# Patient Record
Sex: MALE | Race: BLACK OR AFRICAN AMERICAN | NOT HISPANIC OR LATINO | Employment: FULL TIME | URBAN - METROPOLITAN AREA
[De-identification: names, ages, dates, MRNs, and addresses within clinical notes are randomized per-mention and may not be internally consistent; named-entity substitution may affect disease eponyms.]

---

## 2023-05-20 ENCOUNTER — ANCILLARY PROCEDURE (OUTPATIENT)
Dept: GENERAL RADIOLOGY | Facility: CLINIC | Age: 23
End: 2023-05-20
Attending: PHYSICIAN ASSISTANT
Payer: COMMERCIAL

## 2023-05-20 ENCOUNTER — OFFICE VISIT (OUTPATIENT)
Dept: URGENT CARE | Facility: URGENT CARE | Age: 23
End: 2023-05-20
Payer: COMMERCIAL

## 2023-05-20 VITALS
DIASTOLIC BLOOD PRESSURE: 76 MMHG | OXYGEN SATURATION: 98 % | HEART RATE: 66 BPM | WEIGHT: 150.6 LBS | SYSTOLIC BLOOD PRESSURE: 123 MMHG | TEMPERATURE: 98 F

## 2023-05-20 DIAGNOSIS — S90.31XA CONTUSION OF RIGHT FOOT, INITIAL ENCOUNTER: Primary | ICD-10-CM

## 2023-05-20 DIAGNOSIS — M79.671 RIGHT FOOT PAIN: ICD-10-CM

## 2023-05-20 PROCEDURE — 73630 X-RAY EXAM OF FOOT: CPT | Mod: TC | Performed by: RADIOLOGY

## 2023-05-20 PROCEDURE — 99204 OFFICE O/P NEW MOD 45 MIN: CPT | Performed by: PHYSICIAN ASSISTANT

## 2023-05-20 ASSESSMENT — ENCOUNTER SYMPTOMS
FREQUENCY: 0
RESPIRATORY NEGATIVE: 1
NAUSEA: 0
DYSURIA: 0
DIARRHEA: 0
VOMITING: 0
CONSTITUTIONAL NEGATIVE: 1
HEADACHES: 0
SHORTNESS OF BREATH: 0
WHEEZING: 0
ALLERGIC/IMMUNOLOGIC NEGATIVE: 1
MYALGIAS: 0
NEUROLOGICAL NEGATIVE: 1
FEVER: 0
HEMATURIA: 0
SORE THROAT: 0
COUGH: 0
ARTHRALGIAS: 1
ABDOMINAL PAIN: 0
PALPITATIONS: 0
CHILLS: 0
CARDIOVASCULAR NEGATIVE: 1
CHEST TIGHTNESS: 0
GASTROINTESTINAL NEGATIVE: 1

## 2023-05-20 NOTE — LETTER
May 20, 2023      Ghazala Perrin  [address] [facility] [id number]        To Whom It May Concern:    Ghazala Perrin  was seen on 5/20/2023.  He may return to work with no restrictions his next available shift.      Sincerely,        Zaid Fish PA-C

## 2023-05-20 NOTE — PROGRESS NOTES
Chief Complaint:     Chief Complaint   Patient presents with     Foot Pain     Right; pallet fell on foot      ASSESSMENT     1. Contusion of right foot, initial encounter    2. Right foot pain       PLAN    XR of the R foot was negative for any acute fracture per my read.  RICE discussed  Recommended rest and avoidance of activities which cause pain or swelling.  Pain relief: Acetaminophen and or Ibuprofen with food.  Patient verbalized understanding, and agrees with this plan.       HPI: Ghazala Perrin is an 23 year old male who presents today for evaluation of R foot injury.  Onset of the injury was this morning after a pallet fell on his foot at work.  He complains of foot pain and some swelling. Nothing makes the pain better or worse.  He has not tried anything for the pain.    Patient denies any numbness, tingling, or dysfunction of the R foot.    Patient is new to [facility].      ROS:      Review of Systems   Constitutional: Negative.  Negative for chills and fever.   HENT: Negative.  Negative for sore throat.    Respiratory: Negative.  Negative for cough, chest tightness, shortness of breath and wheezing.    Cardiovascular: Negative.  Negative for chest pain and palpitations.   Gastrointestinal: Negative.  Negative for abdominal pain, diarrhea, nausea and vomiting.   Genitourinary: Negative for dysuria, frequency, hematuria and urgency.   Musculoskeletal: Positive for arthralgias. Negative for myalgias.   Skin: Negative for rash.   Allergic/Immunologic: Negative.  Negative for immunocompromised state.   Neurological: Negative.  Negative for headaches.        Problem history  There is no problem list on file for this patient.       Allergies  No Known Allergies     Smoking History  History   Smoking Status     Never   Smokeless Tobacco     Never        Current Meds  No current outpatient medications on file.        Vital signs reviewed by Zaid Fish PA-C  /76   Pulse 66   Temp  98  F (36.7  C) (Tympanic)   Wt 68.3 kg (150 lb 9.6 oz)   SpO2 98%     Physical Exam     Physical Exam  Vitals and nursing note reviewed.   Constitutional:       General: He is not in acute distress.     Appearance: He is well-developed. He is not ill-appearing, toxic-appearing or diaphoretic.   HENT:      Head: Normocephalic and atraumatic.      Right Ear: Hearing, tympanic membrane, ear canal and external ear normal. Tympanic membrane is not perforated, erythematous, retracted or bulging.      Left Ear: Hearing, tympanic membrane, ear canal and external ear normal. Tympanic membrane is not perforated, erythematous, retracted or bulging.      Nose: Nose normal. No mucosal edema, congestion or rhinorrhea.      Mouth/Throat:      Pharynx: No oropharyngeal exudate or posterior oropharyngeal erythema.      Tonsils: No tonsillar exudate or tonsillar abscesses. 0 on the right. 0 on the left.   Eyes:      Pupils: Pupils are equal, round, and reactive to light.   Cardiovascular:      Rate and Rhythm: Normal rate and regular rhythm.      Heart sounds: Normal heart sounds, S1 normal and S2 normal. Heart sounds not distant. No murmur heard.     No friction rub. No gallop.   Pulmonary:      Effort: Pulmonary effort is normal. No respiratory distress.      Breath sounds: Normal breath sounds. No decreased breath sounds, wheezing, rhonchi or rales.   Abdominal:      General: Bowel sounds are normal. There is no distension.      Palpations: Abdomen is soft.      Tenderness: There is no abdominal tenderness.   Musculoskeletal:      Cervical back: Normal range of motion and neck supple.      Right foot: Normal range of motion and normal capillary refill. Tenderness present. No swelling, deformity, bony tenderness or crepitus. Normal pulse.        Feet:    Lymphadenopathy:      Cervical: No cervical adenopathy.   Skin:     General: Skin is warm and dry.      Findings: No rash.   Neurological:      Mental Status: He is alert.       Cranial Nerves: No cranial nerve deficit.   Psychiatric:         Attention and Perception: He is attentive.         Speech: Speech normal.         Behavior: Behavior normal. Behavior is cooperative.         Thought Content: Thought content normal.         Judgment: Judgment normal.             Zaid Fish PA-C  5/20/2023, 10:33 AM